# Patient Record
Sex: FEMALE | Race: WHITE | NOT HISPANIC OR LATINO | Employment: OTHER | URBAN - METROPOLITAN AREA
[De-identification: names, ages, dates, MRNs, and addresses within clinical notes are randomized per-mention and may not be internally consistent; named-entity substitution may affect disease eponyms.]

---

## 2022-09-20 ENCOUNTER — HOSPITAL ENCOUNTER (EMERGENCY)
Facility: HOSPITAL | Age: 62
Discharge: LEFT BEFORE DISCHARGE | End: 2022-09-20

## 2022-09-20 VITALS
OXYGEN SATURATION: 100 % | SYSTOLIC BLOOD PRESSURE: 114 MMHG | RESPIRATION RATE: 20 BRPM | HEIGHT: 67 IN | TEMPERATURE: 98.4 F | DIASTOLIC BLOOD PRESSURE: 66 MMHG | HEART RATE: 87 BPM | WEIGHT: 137.13 LBS | BODY MASS INDEX: 21.52 KG/M2

## 2022-09-20 PROCEDURE — 99211 OFF/OP EST MAY X REQ PHY/QHP: CPT

## 2022-09-21 ENCOUNTER — APPOINTMENT (OUTPATIENT)
Dept: GENERAL RADIOLOGY | Facility: HOSPITAL | Age: 62
End: 2022-09-21

## 2022-09-21 ENCOUNTER — HOSPITAL ENCOUNTER (EMERGENCY)
Facility: HOSPITAL | Age: 62
Discharge: HOME | End: 2022-09-21
Attending: EMERGENCY MEDICINE | Admitting: EMERGENCY MEDICINE

## 2022-09-21 VITALS
OXYGEN SATURATION: 100 % | BODY MASS INDEX: 20.75 KG/M2 | HEIGHT: 68 IN | SYSTOLIC BLOOD PRESSURE: 101 MMHG | TEMPERATURE: 98.1 F | RESPIRATION RATE: 18 BRPM | WEIGHT: 136.91 LBS | HEART RATE: 62 BPM | DIASTOLIC BLOOD PRESSURE: 61 MMHG

## 2022-09-21 DIAGNOSIS — M16.0 OSTEOARTHRITIS OF BOTH HIPS, UNSPECIFIED OSTEOARTHRITIS TYPE: Primary | ICD-10-CM

## 2022-09-21 PROCEDURE — 25010000002 KETOROLAC TROMETHAMINE PER 15 MG

## 2022-09-21 PROCEDURE — 99282 EMERGENCY DEPT VISIT SF MDM: CPT

## 2022-09-21 PROCEDURE — 96372 THER/PROPH/DIAG INJ SC/IM: CPT

## 2022-09-21 PROCEDURE — 73521 X-RAY EXAM HIPS BI 2 VIEWS: CPT

## 2022-09-21 RX ORDER — LISINOPRIL 20 MG/1
20 TABLET ORAL DAILY
COMMUNITY

## 2022-09-21 RX ORDER — DULOXETIN HYDROCHLORIDE 30 MG/1
30 CAPSULE, DELAYED RELEASE ORAL 2 TIMES DAILY
COMMUNITY
End: 2023-02-10

## 2022-09-21 RX ORDER — KETOROLAC TROMETHAMINE 30 MG/ML
30 INJECTION, SOLUTION INTRAMUSCULAR; INTRAVENOUS ONCE
Status: COMPLETED | OUTPATIENT
Start: 2022-09-21 | End: 2022-09-21

## 2022-09-21 RX ORDER — LIDOCAINE 50 MG/G
1 PATCH TOPICAL EVERY 24 HOURS
COMMUNITY

## 2022-09-21 RX ORDER — FLUTICASONE PROPIONATE 50 MCG
2 SPRAY, SUSPENSION (ML) NASAL DAILY
COMMUNITY

## 2022-09-21 RX ORDER — METOPROLOL SUCCINATE 25 MG/1
12.5 TABLET, EXTENDED RELEASE ORAL DAILY
COMMUNITY

## 2022-09-21 RX ADMIN — KETOROLAC TROMETHAMINE 30 MG: 30 INJECTION, SOLUTION INTRAMUSCULAR; INTRAVENOUS at 17:05

## 2022-09-21 NOTE — DISCHARGE INSTRUCTIONS
Take diclofenac as needed for hip pain.  Please follow-up with your primary care provider for monitoring of your arthritis of your hips.

## 2022-09-21 NOTE — ED PROVIDER NOTES
"Time: 5:12 PM EDT  Arrived by: private car  Chief Complaint: Hip pain  History provided by: Patient  History is limited by: N/A     History of Present Illness:  Patient is a 61 y.o. year old female who presents to the emergency department with bilateral hip pain.      Patient presents to the emergency department today complaining of bilateral hip pain.  Patient states she has chronic sciatica.  Patient states she has had bilateral hip pain for continuous months.  Patient states the pain waxes and wanes, but is achy in nature.  Patient currently rates the pain 8 out of 10.  Patient states she has been taking Tylenol and applying lidocaine patches to improve the pain.  Patient states she has previously had x-rays and CTs that did not show any pathology.  Patient states she also recently went to see her OB/GYN who cleared her for any pelvic abnormalities.  Patient denies any recent fall or injury.  No other complaints.      History provided by:  Patient   used: No    Hip Pain  Location:  Bilateral hip pain  Quality:  Aching  Severity:  Mild  Onset quality:  Gradual  Timing:  Intermittent  Progression:  Waxing and waning  Associated symptoms: no abdominal pain, no chest pain, no congestion, no cough, no diarrhea, no ear pain, no fatigue, no fever, no headaches, no loss of consciousness, no myalgias, no nausea, no rash, no rhinorrhea, no shortness of breath, no sore throat, no vomiting and no wheezing        Similar Symptoms Previously: Yes  Recently seen: No      Patient Care Team  Primary Care Provider: Aaliyah Black MD    Past Medical History:     Allergies   Allergen Reactions   • Gabapentin (Once-Daily) Other (See Comments)     Pt states that she went \"crazy\" when she was on that medication.    • Tramadol Hcl Dizziness     Past Medical History:   Diagnosis Date   • Chronic back pain    • Depression    • Enlarged heart    • Sciatica      Past Surgical History:   Procedure Laterality Date   • " " SECTION       History reviewed. No pertinent family history.    Home Medications:  Prior to Admission medications    Medication Sig Start Date End Date Taking? Authorizing Provider   DULoxetine (CYMBALTA) 30 MG capsule Take 30 mg by mouth 2 (Two) Times a Day.    Cole Erazo MD   empagliflozin (JARDIANCE) 25 MG tablet tablet Take  by mouth Daily.    Cole Erazo MD   fluticasone (FLONASE) 50 MCG/ACT nasal spray 2 sprays into the nostril(s) as directed by provider Daily.    Cole Erazo MD   lidocaine (LIDODERM) 5 % Place 1 patch on the skin as directed by provider Daily. Remove & Discard patch within 12 hours or as directed by MD    Cole Erazo MD   lisinopril (PRINIVIL,ZESTRIL) 20 MG tablet Take 10 mg by mouth Daily.    Cole Erazo MD   metoprolol succinate XL (TOPROL-XL) 25 MG 24 hr tablet Take 25 mg by mouth Daily.    Cole Erazo MD        Social History:   Social History     Tobacco Use   • Smoking status: Former Smoker   • Smokeless tobacco: Never Used   Vaping Use   • Vaping Use: Never used   Substance Use Topics   • Alcohol use: Never   • Drug use: Never     Recent travel: no     Review of Systems:  Review of Systems   Constitutional: Negative for chills, fatigue and fever.   HENT: Negative for congestion, ear pain, rhinorrhea and sore throat.    Eyes: Negative for pain.   Respiratory: Negative for cough, shortness of breath and wheezing.    Cardiovascular: Negative for chest pain.   Gastrointestinal: Negative for abdominal pain, diarrhea, nausea and vomiting.   Genitourinary: Negative for dysuria.   Musculoskeletal: Negative for arthralgias and myalgias.        Bilateral hip pain   Skin: Negative for rash.   Neurological: Negative for loss of consciousness and headaches.        Physical Exam:  /57 (BP Location: Left arm, Patient Position: Sitting)   Pulse 73   Temp 98.6 °F (37 °C) (Oral)   Resp 16   Ht 172.7 cm (68\")   Wt 62.1 kg " (136 lb 14.5 oz)   SpO2 100%   BMI 20.82 kg/m²     Physical Exam  Vitals and nursing note reviewed.   Constitutional:       General: She is not in acute distress.     Appearance: Normal appearance. She is normal weight. She is not ill-appearing, toxic-appearing or diaphoretic.   HENT:      Head: Normocephalic and atraumatic.      Nose: Nose normal.   Eyes:      Extraocular Movements: Extraocular movements intact.      Conjunctiva/sclera: Conjunctivae normal.      Pupils: Pupils are equal, round, and reactive to light.   Cardiovascular:      Rate and Rhythm: Normal rate and regular rhythm.      Heart sounds: Normal heart sounds.   Pulmonary:      Effort: Pulmonary effort is normal.      Breath sounds: Normal breath sounds.   Abdominal:      General: Abdomen is flat. Bowel sounds are normal. There is no distension.      Palpations: Abdomen is soft.      Tenderness: There is no abdominal tenderness. There is no guarding.   Musculoskeletal:         General: Normal range of motion.      Cervical back: Normal range of motion and neck supple.        Legs:       Comments: Bilateral tenderness to palpation along the lateral upper thigh as shown in the diagram above.  Normal range of motion.  Neurovascularly intact.  No bony tenderness noted.  Normal strength.  Normal gait.   Skin:     General: Skin is warm and dry.   Neurological:      General: No focal deficit present.      Mental Status: She is alert and oriented to person, place, and time.   Psychiatric:         Mood and Affect: Mood normal.         Behavior: Behavior normal.         Thought Content: Thought content normal.         Judgment: Judgment normal.                Medications in the Emergency Department:  Medications   ketorolac (TORADOL) injection 30 mg (30 mg Intramuscular Given 9/21/22 1705)        Labs  Lab Results (last 24 hours)     ** No results found for the last 24 hours. **           Imaging:  XR Hips Bilateral With or Without Pelvis 2 View    Result  Date: 9/21/2022  PROCEDURE: XR HIPS BILATERAL W OR WO PELVIS 2 VIEW  COMPARISON: None  INDICATIONS: bilateral hip pain, fall x 1 month ago  FINDINGS:  Degenerative changes of the pubic symphysis.  Osteopenia.  Moderate osteoarthritic changes of the right hip with joint space narrowing and osteophytes.  No definite fracture.  There is some nonspecific sclerosis in the left trochanter.  Degenerative changes seen of the left hip with joint space narrowing and osteophytosis.  Pelvic calcifications likely reflect phleboliths.        1. No definite fracture the left or right hip.  Moderate osteoarthritic changes.      PB DAVE MD       Electronically Signed and Approved By: PB DAVE MD on 9/21/2022 at 17:51               Procedures:  Procedures    Progress                            Medical Decision Making:  MDM  Number of Diagnoses or Management Options  Diagnosis management comments: I have spoken with patient. I have explained the patient´s condition, diagnoses and treatment plan based on the information available to me at this time. I have answered the patient's questions and addressed any concerns. The patient has a good  understanding of the patient´s diagnosis, condition, and treatment plan as can be expected at this point. The vital signs have been stable. The patient´s condition is stable and appropriate for discharge from the emergency department.      The patient will pursue further outpatient evaluation with the primary care physician or other designated or consulting physician as outlined in the discharge instructions. They are agreeable to this plan of care and follow-up instructions have been explained in detail. The patient has received these instructions in written format and have expressed an understanding of the discharge instructions. The patient is aware that any significant change in condition or worsening of symptoms should prompt an immediate return to this or the closest  emergency department or call to 911.       Amount and/or Complexity of Data Reviewed  Tests in the radiology section of CPT®: ordered and reviewed    Risk of Complications, Morbidity, and/or Mortality  Presenting problems: low  Diagnostic procedures: low  Management options: low    Patient Progress  Patient progress: stable       Final diagnoses:   Osteoarthritis of both hips, unspecified osteoarthritis type        Disposition:  ED Disposition     ED Disposition   Discharge    Condition   Stable    Comment   --             This medical record created using voice recognition software.           Jose Alejandro Gama PA-C  09/21/22 9112

## 2022-10-11 ENCOUNTER — OFFICE VISIT (OUTPATIENT)
Dept: GASTROENTEROLOGY | Facility: CLINIC | Age: 62
End: 2022-10-11

## 2022-10-11 VITALS
HEIGHT: 68 IN | BODY MASS INDEX: 20.61 KG/M2 | HEART RATE: 67 BPM | WEIGHT: 136 LBS | DIASTOLIC BLOOD PRESSURE: 53 MMHG | SYSTOLIC BLOOD PRESSURE: 109 MMHG

## 2022-10-11 DIAGNOSIS — R13.19 ESOPHAGEAL DYSPHAGIA: ICD-10-CM

## 2022-10-11 DIAGNOSIS — Z80.0 FAMILY HISTORY OF GASTRIC CANCER: ICD-10-CM

## 2022-10-11 DIAGNOSIS — Z86.010 HISTORY OF COLON POLYPS: Primary | ICD-10-CM

## 2022-10-11 PROBLEM — Z86.0100 HISTORY OF COLON POLYPS: Status: ACTIVE | Noted: 2022-10-11

## 2022-10-11 PROCEDURE — 99203 OFFICE O/P NEW LOW 30 MIN: CPT | Performed by: NURSE PRACTITIONER

## 2022-10-11 RX ORDER — POLYETHYLENE GLYCOL 3350, SODIUM SULFATE, SODIUM CHLORIDE, POTASSIUM CHLORIDE, ASCORBIC ACID, SODIUM ASCORBATE 140-9-5.2G
1 KIT ORAL TAKE AS DIRECTED
Qty: 1 EACH | Refills: 0 | Status: SHIPPED | OUTPATIENT
Start: 2022-10-11 | End: 2022-10-13 | Stop reason: CLARIF

## 2022-10-11 NOTE — PROGRESS NOTES
Chief Complaint     personal history of colon polyps    History of Present Illness     Risa Howard is a 61 y.o. female who presents to [facility] on referral from Aaliyah Black MD for a gastroenterology evaluation of history of colon polyps and dysphagia.      Reports previous colonoscopy in 2017 was her first colonoscopy.  She is having a daily bowel movement without hematochezia and melena.       Previous colonoscopy 2017 rectal polyp with chronic nonspecific inflammation and focal mucosal glandular hyperplasia.    Denies symptoms of reflux.  Reports noticing some dysphagia occasionally with foods.  Denies this being associated with any particular foods.  Notices this daily.   She thinks that her father had stomach cancer in the 70's and had to have most of his stomach removed.         History      Past Medical History:   Diagnosis Date   • Chronic back pain    • Depression    • Enlarged heart    • Sciatica        Past Surgical History:   Procedure Laterality Date   •  SECTION     • COLONOSCOPY         Family History   Problem Relation Age of Onset   • Stomach cancer Father         Current Medications        Current Outpatient Medications:   •  diclofenac (VOLTAREN) 50 MG EC tablet, Take 1 tablet by mouth 2 (Two) Times a Day As Needed (moderate pain)., Disp: 30 tablet, Rfl: 0  •  DULoxetine (CYMBALTA) 30 MG capsule, Take 30 mg by mouth 2 (Two) Times a Day., Disp: , Rfl:   •  empagliflozin (JARDIANCE) 25 MG tablet tablet, Take  by mouth Daily., Disp: , Rfl:   •  fluticasone (FLONASE) 50 MCG/ACT nasal spray, 2 sprays into the nostril(s) as directed by provider Daily., Disp: , Rfl:   •  lidocaine (LIDODERM) 5 %, Place 1 patch on the skin as directed by provider Daily. Remove & Discard patch within 12 hours or as directed by MD, Disp: , Rfl:   •  lisinopril (PRINIVIL,ZESTRIL) 20 MG tablet, Take 10 mg by mouth Daily., Disp: , Rfl:   •  metoprolol succinate XL  "(TOPROL-XL) 25 MG 24 hr tablet, Take 25 mg by mouth Daily., Disp: , Rfl:   •  PEG-KCl-NaCl-NaSulf-Na Asc-C (Plenvu) 140 g reconstituted solution solution, Take 140 g by mouth Take As Directed. Attn: Pharmacist: please see notes for coupon code., Disp: 1 each, Rfl: 0     Allergies     Allergies   Allergen Reactions   • Gabapentin (Once-Daily) Other (See Comments)     Pt states that she went \"crazy\" when she was on that medication.    • Tramadol Hcl Dizziness       Social History       Social History     Social History Narrative   • Not on file       Immunizations     Immunization:    There is no immunization history on file for this patient.       Objective     Objective     Vital Signs:   /53 (BP Location: Left arm)   Pulse 67   Ht 172.7 cm (68\")   Wt 61.7 kg (136 lb)   BMI 20.68 kg/m²       Physical Exam    Results      Result Review :                  Assessment and Plan        Assessment and Plan    Diagnoses and all orders for this visit:    1. History of colon polyps (Primary)  -     Case Request; Standing  -     Case Request    2. Esophageal dysphagia  -     Case Request; Standing  -     Case Request    3. Family history of gastric cancer  -     Case Request; Standing  -     Case Request    Other orders  -     Follow Anesthesia Guidelines / Protocol; Future  -     Verify NPO; Standing  -     Verify Bowel Prep Was Successful; Standing  -     Give Tap Water Enema If Bowel Prep Insufficient; Standing  -     PEG-KCl-NaCl-NaSulf-Na Asc-C (Plenvu) 140 g reconstituted solution solution; Take 140 g by mouth Take As Directed. Attn: Pharmacist: please see notes for coupon code.  Dispense: 1 each; Refill: 0        ESOPHAGOGASTRODUODENOSCOPY (N/A), COLONOSCOPY (N/A)  The risk of the endoscopy were discussed in detail. Possible risks/complications, benefits, and alternatives to surgical or invasive procedure have been explained to patient and/or legal guardian; risks include bleeding, infection, and perforation. " Patient has been evaluated and can tolerate anesthesia and/or sedation.       Follow Up        Follow Up   Return for F/U after procedure.  Patient was given instructions and counseling regarding her condition or for health maintenance advice. Please see specific information pulled into the AVS if appropriate.

## 2022-10-13 ENCOUNTER — TELEPHONE (OUTPATIENT)
Dept: GASTROENTEROLOGY | Facility: CLINIC | Age: 62
End: 2022-10-13

## 2022-10-13 RX ORDER — PEG-3350, SODIUM SULFATE, SODIUM CHLORIDE, POTASSIUM CHLORIDE, SODIUM ASCORBATE AND ASCORBIC ACID 7.5-2.691G
1000 KIT ORAL EVERY 12 HOURS
Qty: 1000 ML | Refills: 0 | Status: ON HOLD | OUTPATIENT
Start: 2022-10-13 | End: 2023-02-13

## 2022-10-13 NOTE — TELEPHONE ENCOUNTER
[facility].  Plenvu was sent in for the patient they do not care that formula.  They carry Nulytey and sometimes Movieprep.  Spoke with Homer BASHIR and she stated that is was okay to change to what the pharmacy had.  Verbal they are going to send Nulytley to the patient.

## 2023-01-30 ENCOUNTER — TELEPHONE (OUTPATIENT)
Dept: GASTROENTEROLOGY | Facility: CLINIC | Age: 63
End: 2023-01-30
Payer: OTHER GOVERNMENT

## 2023-02-03 ENCOUNTER — TELEPHONE (OUTPATIENT)
Dept: GASTROENTEROLOGY | Facility: CLINIC | Age: 63
End: 2023-02-03
Payer: OTHER GOVERNMENT

## 2023-02-03 NOTE — TELEPHONE ENCOUNTER
Patient called the office on 02/02/2023 at 2:50 pm and left a message that on 02/13/2023 she is scheduled for an EGD and colonoscopy. Patient stated that she does not want to complete the scope that goes down her throat just the colonoscopy.

## 2023-02-10 RX ORDER — FLUOXETINE HYDROCHLORIDE 20 MG/1
40 CAPSULE ORAL EVERY MORNING
COMMUNITY

## 2023-02-10 NOTE — PRE-PROCEDURE INSTRUCTIONS
Education provided on laxative administration, as well as diet restrictions prior to day of procedure.    Medications were assessed and reviewed.  Patient to stop taking Voltaren now.  The only medication to be taken by 0730 on the morning of the procedure is metoprolol, all other meds to resume post-procedure.    Advised Tylenol is okay, however do not take any NSAIDS.  All vitamins and supplements needs to be held until after the procedure.      Patient verbalized understanding.    Jagruti Johansen RN

## 2023-02-13 ENCOUNTER — ANESTHESIA (OUTPATIENT)
Dept: GASTROENTEROLOGY | Facility: HOSPITAL | Age: 63
End: 2023-02-13
Payer: OTHER GOVERNMENT

## 2023-02-13 ENCOUNTER — TELEPHONE (OUTPATIENT)
Dept: GASTROENTEROLOGY | Facility: CLINIC | Age: 63
End: 2023-02-13
Payer: OTHER GOVERNMENT

## 2023-02-13 ENCOUNTER — HOSPITAL ENCOUNTER (OUTPATIENT)
Facility: HOSPITAL | Age: 63
Setting detail: HOSPITAL OUTPATIENT SURGERY
Discharge: HOME | End: 2023-02-13
Attending: INTERNAL MEDICINE | Admitting: INTERNAL MEDICINE
Payer: OTHER GOVERNMENT

## 2023-02-13 ENCOUNTER — ANESTHESIA EVENT (OUTPATIENT)
Dept: GASTROENTEROLOGY | Facility: HOSPITAL | Age: 63
End: 2023-02-13
Payer: OTHER GOVERNMENT

## 2023-02-13 VITALS
RESPIRATION RATE: 16 BRPM | DIASTOLIC BLOOD PRESSURE: 67 MMHG | SYSTOLIC BLOOD PRESSURE: 102 MMHG | WEIGHT: 135 LBS | TEMPERATURE: 98.9 F | HEIGHT: 68 IN | HEART RATE: 81 BPM | BODY MASS INDEX: 20.46 KG/M2 | OXYGEN SATURATION: 97 %

## 2023-02-13 PROCEDURE — 45378 DIAGNOSTIC COLONOSCOPY: CPT | Performed by: INTERNAL MEDICINE

## 2023-02-13 PROCEDURE — 25010000002 PROPOFOL 10 MG/ML EMULSION: Performed by: NURSE ANESTHETIST, CERTIFIED REGISTERED

## 2023-02-13 RX ORDER — LIDOCAINE HYDROCHLORIDE 20 MG/ML
INJECTION, SOLUTION EPIDURAL; INFILTRATION; INTRACAUDAL; PERINEURAL AS NEEDED
Status: DISCONTINUED | OUTPATIENT
Start: 2023-02-13 | End: 2023-02-13 | Stop reason: SURG

## 2023-02-13 RX ORDER — SODIUM CHLORIDE, SODIUM LACTATE, POTASSIUM CHLORIDE, CALCIUM CHLORIDE 600; 310; 30; 20 MG/100ML; MG/100ML; MG/100ML; MG/100ML
30 INJECTION, SOLUTION INTRAVENOUS CONTINUOUS
Status: DISCONTINUED | OUTPATIENT
Start: 2023-02-13 | End: 2023-02-13 | Stop reason: HOSPADM

## 2023-02-13 RX ORDER — SODIUM CHLORIDE, SODIUM LACTATE, POTASSIUM CHLORIDE, CALCIUM CHLORIDE 600; 310; 30; 20 MG/100ML; MG/100ML; MG/100ML; MG/100ML
INJECTION, SOLUTION INTRAVENOUS CONTINUOUS PRN
Status: DISCONTINUED | OUTPATIENT
Start: 2023-02-13 | End: 2023-02-13 | Stop reason: SURG

## 2023-02-13 RX ORDER — PROPOFOL 10 MG/ML
VIAL (ML) INTRAVENOUS AS NEEDED
Status: DISCONTINUED | OUTPATIENT
Start: 2023-02-13 | End: 2023-02-13 | Stop reason: SURG

## 2023-02-13 RX ADMIN — SODIUM CHLORIDE, POTASSIUM CHLORIDE, SODIUM LACTATE AND CALCIUM CHLORIDE: 600; 310; 30; 20 INJECTION, SOLUTION INTRAVENOUS at 13:21

## 2023-02-13 RX ADMIN — PROPOFOL 50 MG: 10 INJECTION, EMULSION INTRAVENOUS at 13:38

## 2023-02-13 RX ADMIN — PROPOFOL 150 MCG/KG/MIN: 10 INJECTION, EMULSION INTRAVENOUS at 13:38

## 2023-02-13 RX ADMIN — SODIUM CHLORIDE, POTASSIUM CHLORIDE, SODIUM LACTATE AND CALCIUM CHLORIDE 30 ML/HR: 600; 310; 30; 20 INJECTION, SOLUTION INTRAVENOUS at 11:25

## 2023-02-13 RX ADMIN — LIDOCAINE HYDROCHLORIDE 50 MG: 20 INJECTION, SOLUTION EPIDURAL; INFILTRATION; INTRACAUDAL; PERINEURAL at 13:39

## 2023-02-13 NOTE — ANESTHESIA POSTPROCEDURE EVALUATION
Patient: Risa Howard    Procedure Summary     Date: 02/13/23 Room / Location: [facility] ENDOSCOPY 5 / [facility] ENDOSCOPY    Anesthesia Start: 1321 Anesthesia Stop: 1409    Procedure: COLONOSCOPY Diagnosis:       History of colon polyps      Esophageal dysphagia      Family history of gastric cancer      (History of colon polyps [Z86.010])      (Esophageal dysphagia [R13.19])      (Family history of gastric cancer [Z80.0])    Surgeons: Candy Ho MD Provider: Alyssa Ramirez MD    Anesthesia Type: general, MAC ASA Status: 2          Anesthesia Type: general, MAC    Vitals  Vitals Value Taken Time   BP 93/49 02/13/23 1409   Temp 36.7 °C (98 °F) 02/13/23 1408   Pulse 68 02/13/23 1411   Resp 14 02/13/23 1408   SpO2 100 % 02/13/23 1411   Vitals shown include unvalidated device data.        Post Anesthesia Care and Evaluation    Patient location during evaluation: bedside  Patient participation: complete - patient participated  Level of consciousness: awake  Pain management: adequate    Airway patency: patent  PONV Status: none  Cardiovascular status: acceptable  Respiratory status: acceptable  Hydration status: acceptable    Comments: An Anesthesiologist personally participated in the most demanding procedures (including induction and emergence if applicable) in the anesthesia plan, monitored the course of anesthesia administration at frequent intervals and remained physically present and available for immediate diagnosis and treatment of emergencies.

## 2023-02-13 NOTE — ANESTHESIA PREPROCEDURE EVALUATION
Anesthesia Evaluation     Patient summary reviewed and Nursing notes reviewed   no history of anesthetic complications:  NPO Solid Status: > 8 hours  NPO Liquid Status: > 2 hours           Airway   Mallampati: II  TM distance: >3 FB  Neck ROM: full  No difficulty expected  Dental    (+) poor dentition    Pulmonary - normal exam    breath sounds clear to auscultation  (+) a smoker Former,   Cardiovascular - negative cardio ROS and normal exam  Exercise tolerance: good (4-7 METS)    Patient on routine beta blocker and Beta blocker given within 24 hours of surgery  Rhythm: regular  Rate: normal        Neuro/Psych- negative ROS  GI/Hepatic/Renal/Endo - negative ROS     Musculoskeletal     Abdominal    Substance History - negative use     OB/GYN negative ob/gyn ROS         Other   arthritis,      ROS/Med Hx Other: PAT Nursing Notes unavailable.                   Anesthesia Plan    ASA 2     general and MAC     (Patient understands anesthesia not responsible for dental damage.)  intravenous induction     Anesthetic plan, risks, benefits, and alternatives have been provided, discussed and informed consent has been obtained with: patient.    Use of blood products discussed with patient .   Plan discussed with CRNA.        CODE STATUS:

## 2023-02-13 NOTE — H&P
"Pre Procedure History & Physical    Chief Complaint:   Surveillance colonoscopy    Subjective     HPI:   61 yo F here for eval of surveillance colonoscopy.    Past Medical History:   Past Medical History:   Diagnosis Date   • Arthritis    • Chronic back pain    • Depression    • Enlarged heart    • Sciatica        Past Surgical History:  Past Surgical History:   Procedure Laterality Date   • CARDIAC CATHETERIZATION      no stents placed   •  SECTION      x 2   • COLONOSCOPY         Family History:  Family History   Problem Relation Age of Onset   • Stomach cancer Father    • Malig Hyperthermia Neg Hx        Social History:   reports that she has quit smoking. She has never used smokeless tobacco. She reports that she does not drink alcohol and does not use drugs.    Medications:   Medications Prior to Admission   Medication Sig Dispense Refill Last Dose   • diclofenac (VOLTAREN) 50 MG EC tablet Take 1 tablet by mouth 2 (Two) Times a Day As Needed (moderate pain). 30 tablet 0 2023   • empagliflozin (JARDIANCE) 25 MG tablet tablet Take 25 mg by mouth Every Morning.   2023   • FLUoxetine (PROzac) 20 MG capsule Take 40 mg by mouth Every Morning.   2023   • fluticasone (FLONASE) 50 MCG/ACT nasal spray 2 sprays into the nostril(s) as directed by provider Daily.   2023   • lidocaine (LIDODERM) 5 % Place 1 patch on the skin as directed by provider Daily. Remove & Discard patch within 12 hours or as directed by MD   Past Week   • lisinopril (PRINIVIL,ZESTRIL) 20 MG tablet Take 20 mg by mouth Daily.   2023   • metoprolol succinate XL (TOPROL-XL) 25 MG 24 hr tablet Take 12.5 mg by mouth Daily.   2023       Allergies:  Gabapentin (once-daily) and Tramadol hcl    ROS:    Pertinent items are noted in HPI     Objective     Blood pressure 117/68, pulse 66, temperature 97.2 °F (36.2 °C), temperature source Temporal, resp. rate 20, height 172.7 cm (68\"), weight 61.2 kg (135 lb), SpO2 100 " %.    Physical Exam   Constitutional: Pt is oriented to person, place, and time and well-developed, well-nourished, and in no distress.   Mouth/Throat: Oropharynx is clear and moist.   Neck: Normal range of motion.   Cardiovascular: Normal rate, regular rhythm and normal heart sounds.    Pulmonary/Chest: Effort normal and breath sounds normal.   Abdominal: Soft. Nontender  Skin: Skin is warm and dry.   Psychiatric: Mood, memory, affect and judgment normal.     Assessment & Plan     Diagnosis:  Surveillance colonoscopy    Anticipated Surgical Procedure:  Colonoscopy    The risks, benefits, and alternatives of this procedure have been discussed with the patient or the responsible party- the patient understands and agrees to proceed.

## 2025-01-29 ENCOUNTER — OFFICE VISIT (OUTPATIENT)
Dept: FAMILY MEDICINE CLINIC | Facility: CLINIC | Age: 65
End: 2025-01-29
Payer: MEDICARE

## 2025-01-29 VITALS
SYSTOLIC BLOOD PRESSURE: 103 MMHG | HEIGHT: 68 IN | HEART RATE: 78 BPM | WEIGHT: 143 LBS | OXYGEN SATURATION: 99 % | DIASTOLIC BLOOD PRESSURE: 89 MMHG | BODY MASS INDEX: 21.67 KG/M2

## 2025-01-29 DIAGNOSIS — Z00.00 MEDICARE ANNUAL WELLNESS VISIT, INITIAL: Primary | ICD-10-CM

## 2025-01-29 DIAGNOSIS — R73.09 HIGH GLUCOSE: ICD-10-CM

## 2025-01-29 DIAGNOSIS — Z12.31 ENCOUNTER FOR SCREENING MAMMOGRAM FOR MALIGNANT NEOPLASM OF BREAST: ICD-10-CM

## 2025-01-29 DIAGNOSIS — Z78.0 POSTMENOPAUSAL: ICD-10-CM

## 2025-01-29 DIAGNOSIS — M54.9 BACK PAIN, UNSPECIFIED BACK LOCATION, UNSPECIFIED BACK PAIN LATERALITY, UNSPECIFIED CHRONICITY: ICD-10-CM

## 2025-01-29 DIAGNOSIS — I42.9 CARDIOMYOPATHY, UNSPECIFIED TYPE: ICD-10-CM

## 2025-01-29 DIAGNOSIS — B19.20 HEPATITIS C TEST POSITIVE: ICD-10-CM

## 2025-01-29 LAB
ALBUMIN UR-MCNC: <1.2 MG/DL
CHOLEST SERPL-MCNC: 229 MG/DL (ref 0–200)
HBA1C MFR BLD: 5.8 % (ref 4.8–5.6)
HCV AB SER QL: REACTIVE
HDLC SERPL-MCNC: 87 MG/DL (ref 40–60)
LDLC SERPL CALC-MCNC: 127 MG/DL (ref 0–100)
LDLC/HDLC SERPL: 1.43 {RATIO}
TRIGL SERPL-MCNC: 88 MG/DL (ref 0–150)
TSH SERPL DL<=0.05 MIU/L-ACNC: 3.66 UIU/ML (ref 0.27–4.2)
VLDLC SERPL-MCNC: 15 MG/DL (ref 5–40)

## 2025-01-29 PROCEDURE — 80061 LIPID PANEL: CPT | Performed by: STUDENT IN AN ORGANIZED HEALTH CARE EDUCATION/TRAINING PROGRAM

## 2025-01-29 PROCEDURE — 84443 ASSAY THYROID STIM HORMONE: CPT | Performed by: STUDENT IN AN ORGANIZED HEALTH CARE EDUCATION/TRAINING PROGRAM

## 2025-01-29 PROCEDURE — 82043 UR ALBUMIN QUANTITATIVE: CPT | Performed by: STUDENT IN AN ORGANIZED HEALTH CARE EDUCATION/TRAINING PROGRAM

## 2025-01-29 PROCEDURE — 83036 HEMOGLOBIN GLYCOSYLATED A1C: CPT | Performed by: STUDENT IN AN ORGANIZED HEALTH CARE EDUCATION/TRAINING PROGRAM

## 2025-01-29 PROCEDURE — 86803 HEPATITIS C AB TEST: CPT | Performed by: STUDENT IN AN ORGANIZED HEALTH CARE EDUCATION/TRAINING PROGRAM

## 2025-01-29 NOTE — PROGRESS NOTES
Subjective   The ABCs of the Annual Wellness Visit  Medicare Wellness Visit      Risa Howard is a 64 y.o. patient who presents for a Medicare Wellness Visit.    The following portions of the patient's history were reviewed and   updated as appropriate: allergies, current medications, past family history, past medical history, past social history, past surgical history, and problem list.    Compared to one year ago, the patient's physical   health is the same.  Compared to one year ago, the patient's mental   health is the same.    Recent Hospitalizations:  She was not admitted to the hospital during the last year.     Current Medical Providers:  Patient Care Team:  Felipe Sadler MD as PCP - General (Internal Medicine)    Outpatient Medications Prior to Visit   Medication Sig Dispense Refill    FLUoxetine (PROzac) 20 MG capsule Take 2 capsules by mouth Every Morning.      fluticasone (FLONASE) 50 MCG/ACT nasal spray Administer 2 sprays into the nostril(s) as directed by provider Daily.      lidocaine (LIDODERM) 5 % Place 1 patch on the skin as directed by provider Daily. Remove & Discard patch within 12 hours or as directed by MD      lisinopril (PRINIVIL,ZESTRIL) 20 MG tablet Take 1 tablet by mouth Daily.      metoprolol succinate XL (TOPROL-XL) 25 MG 24 hr tablet Take 0.5 tablets by mouth Daily.      diclofenac (VOLTAREN) 50 MG EC tablet Take 1 tablet by mouth 2 (Two) Times a Day As Needed (moderate pain). (Patient not taking: Reported on 1/29/2025) 30 tablet 0    empagliflozin (JARDIANCE) 25 MG tablet tablet Take 25 mg by mouth Every Morning. (Patient not taking: Reported on 1/29/2025)       No facility-administered medications prior to visit.     No opioid medication identified on active medication list. I have reviewed chart for other potential  high risk medication/s and harmful drug interactions in the elderly.      Aspirin is not on active medication list.  Aspirin use is not indicated based on  "review of current medical condition/s. Risk of harm outweighs potential benefits.  .    Patient Active Problem List   Diagnosis    History of colon polyps    Esophageal dysphagia    Family history of gastric cancer     Advance Care Planning Advance Directive is not on file.  ACP discussion was held with the patient during this visit. Patient does not have an advance directive, information provided.            Objective   Vitals:    01/29/25 1432   BP: 103/89   Pulse: 78   SpO2: 99%   Weight: 64.9 kg (143 lb)   Height: 172.7 cm (67.99\")     Vision Screening    Right eye Left eye Both eyes   Without correction      With correction   20:20      Estimated body mass index is 21.75 kg/m² as calculated from the following:    Height as of this encounter: 172.7 cm (67.99\").    Weight as of this encounter: 64.9 kg (143 lb).    BMI is within normal parameters. No other follow-up for BMI required.           Does the patient have evidence of cognitive impairment? No          Fall Risk Assessment was completed, and patient is at moderate risk for falls.                                                                                       Health  Risk Assessment    Smoking Status:  Social History     Tobacco Use   Smoking Status Former   Smokeless Tobacco Never     Alcohol Consumption:  Social History     Substance and Sexual Activity   Alcohol Use Never       Fall Risk Screen  STEADI Fall Risk Assessment was completed, and patient is at LOW risk for falls.Assessment completed on:1/29/2025    Depression Screening   Little interest or pleasure in doing things? Several days   Feeling down, depressed, or hopeless? Almost all   PHQ-2 Total Score 4   Trouble falling or staying asleep, or sleeping too much? Almost all   Feeling tired or having little energy? Almost all   Poor appetite or overeating? Not at all   Feeling bad about yourself - or that you are a failure or have let yourself or your family down? Almost all   Trouble " concentrating on things, such as reading the newspaper or watching television? Not at all   Moving or speaking so slowly that other people could have noticed? Or the opposite - being so fidgety or restless that you have been moving around a lot more than usual? Not at all   Thoughts that you would be better off dead, or of hurting yourself in some way? Not at all   PHQ-9 Total Score 13   If you checked off any problems, how difficult have these problems made it for you to do your work, take care of things at home, or get along with other people? Extremely difficult      Health Habits and Functional and Cognitive Screenin/29/2025     2:00 PM   Functional & Cognitive Status   Do you have difficulty preparing food and eating? No   Do you have difficulty bathing yourself, getting dressed or grooming yourself? No   Do you have difficulty using the toilet? No   Do you have difficulty moving around from place to place? No   Do you have trouble with steps or getting out of a bed or a chair? No   Current Diet Well Balanced Diet   Dental Exam Not up to date   Eye Exam Not up to date   Exercise (times per week) 0 times per week   Current Exercises Include No Regular Exercise   Do you need help using the phone?  No   Are you deaf or do you have serious difficulty hearing?  No   Do you need help to go to places out of walking distance? No   Do you need help shopping? No   Do you need help preparing meals?  No   Do you need help with housework?  No   Do you need help with laundry? No   Do you need help taking your medications? No   Do you need help managing money? No   Do you ever drive or ride in a car without wearing a seat belt? No   Have you felt unusual stress, anger or loneliness in the last month? No   Who do you live with? Alone   If you need help, do you have trouble finding someone available to you? No   Have you been bothered in the last four weeks by sexual problems? No   Do you have difficulty concentrating,  remembering or making decisions? No           Age-appropriate Screening Schedule:  Refer to the list below for future screening recommendations based on patient's age, sex and/or medical conditions. Orders for these recommended tests are listed in the plan section. The patient has been provided with a written plan.    Health Maintenance List  Health Maintenance   Topic Date Due    DXA SCAN  Never done    DIABETIC EYE EXAM  Never done    ZOSTER VACCINE (1 of 2) Never done    MAMMOGRAM  05/26/2014    HEPATITIS C SCREENING  Never done    ANNUAL WELLNESS VISIT  Never done    HEMOGLOBIN A1C  Never done    COVID-19 Vaccine (2 - 2024-25 season) 01/31/2025 (Originally 9/1/2024)    TDAP/TD VACCINES (2 - Td or Tdap) 01/29/2026 (Originally 9/28/2020)    DIABETIC FOOT EXAM  01/29/2026    COLORECTAL CANCER SCREENING  02/13/2028    INFLUENZA VACCINE  Completed    Pneumococcal Vaccine 65+  Completed                                                                                                                                                CMS Preventative Services Quick Reference  Risk Factors Identified During Encounter  None Identified    The above risks/problems have been discussed with the patient.  Pertinent information has been shared with the patient in the After Visit Summary.  An After Visit Summary and PPPS were made available to the patient.    Follow Up:   Next Medicare Wellness visit to be scheduled in 1 year.         Additional E&M Note during same encounter follows:  Patient has additional, significant, and separately identifiable condition(s)/problem(s) that require work above and beyond the Medicare Wellness Visit     Chief Complaint  Annual Exam and Establish Care (New pt needs pcp needs a referral for manas rob )    Subjective   HPI  Risa is also being seen today for an annual adult preventative physical exam.         Patient comes to establish care. She has a pmh of high glucose (not diabetes), HTN, HLD,  "depression, colon polyps. Patient comes for check in comorbidities. She requires labs today. Will follow up. Next visit in 6 months if labs okay.     Mammogram and DEXA ordered.  Pt needs to follow with her ophthalmologist.       Objective   Vital Signs:  /89   Pulse 78   Ht 172.7 cm (67.99\")   Wt 64.9 kg (143 lb)   SpO2 99%   BMI 21.75 kg/m²   Physical Exam  Vitals reviewed.   HENT:      Head: Normocephalic.      Mouth/Throat:      Mouth: Mucous membranes are moist.   Eyes:      Pupils: Pupils are equal, round, and reactive to light.   Cardiovascular:      Rate and Rhythm: Normal rate.   Abdominal:      General: Abdomen is flat.   Musculoskeletal:         General: Normal range of motion.      Cervical back: Normal range of motion.   Skin:     General: Skin is warm.      Capillary Refill: Capillary refill takes less than 2 seconds.   Neurological:      Mental Status: She is alert.     Foot/Ankle Musculoskeletal Exam    Palpation    Right      Right foot/ankle palpation is unremarkable.      Left       Left foot/ankle palpation is unremarkable.      Neurovascular    Right      Right foot/ankle neurovascular exam is normal.      Left      Left foot/ankle neurovascular exam is normal.      General    Neurological: alert       The following data was reviewed by: Felipe Prado MD on 01/29/2025:  Data reviewed : Radiologic studies                    Assessment and Plan Additional age appropriate preventative wellness advice topics were discussed during today's preventative wellness exam(some topics already addressed during AWV portion of the note above):    Physical Activity: Advised cardiovascular activity 150 minutes per week as tolerated. (example brisk walk for 30 minutes, 5 days a week).     Nutrition: Discussed nutrition plan with patient. Information shared in after visit summary. Goal is for a well balanced diet to enhance overall health.     Healthy Weight: Discussed current and goal BMI " with patient. Steps to attain this goal discussed. Information shared in after visit summary.     Gun Safety Awareness Discussion: Information Shared in after visit summary.     Tobacco Misuse Discussion: Information shared in after visit summary.     Motor Vehicle Safety Discussion:  Wearing Seatbelt While in Motor Vehicle recommendation. Adhering to posted speed limit recommendation.     Injury Prevention Discussion:  Information shared in after visit summary.                 Medicare annual wellness visit, initial         Postmenopausal    Orders:    DEXA Bone Density Axial; Future    Encounter for screening mammogram for malignant neoplasm of breast    Orders:    Mammo Screening Digital Tomosynthesis Bilateral With CAD; Future    Back pain, unspecified back location, unspecified back pain laterality, unspecified chronicity    Orders:    TSH    Lipid Panel    Hemoglobin A1c    MicroAlbumin, Urine, Random - Urine, Clean Catch    Hepatitis C antibody    DEXA Bone Density Axial; Future    Mammo Screening Digital Tomosynthesis Bilateral With CAD; Future    Ambulatory Referral to Pain Management    Ambulatory Referral to Physical Therapy for Evaluation & Treatment    XR Spine Lumbar 2 or 3 View; Future    Ambulatory Referral to Cardiology    Cardiomyopathy, unspecified type  Congestive heart failure due to hypertension.  Heart failure is stable.  NYHA Class II.  Continue current treatment regimen.  Heart failure will be reassessed in 6 months.        Orders:    Ambulatory Referral to Cardiology    High glucose    Orders:    TSH    Lipid Panel    Hemoglobin A1c    MicroAlbumin, Urine, Random - Urine, Clean Catch    Hepatitis C antibody    DEXA Bone Density Axial; Future    Mammo Screening Digital Tomosynthesis Bilateral With CAD; Future    Ambulatory Referral to Pain Management    Ambulatory Referral to Physical Therapy for Evaluation & Treatment    XR Spine Lumbar 2 or 3 View; Future    Ambulatory Referral to  Cardiology          I spent 25 minutes caring for Risa on this date of service. This time includes time spent by me in the following activities:preparing for the visit, reviewing tests, obtaining and/or reviewing a separately obtained history, performing a medically appropriate examination and/or evaluation , counseling and educating the patient/family/caregiver, ordering medications, tests, or procedures, referring and communicating with other health care professionals , documenting information in the medical record, independently interpreting results and communicating that information with the patient/family/caregiver, and care coordination  Follow Up   No follow-ups on file.  Patient was given instructions and counseling regarding her condition or for health maintenance advice. Please see specific information pulled into the AVS if appropriate.

## 2025-02-13 ENCOUNTER — TELEPHONE (OUTPATIENT)
Dept: FAMILY MEDICINE CLINIC | Facility: CLINIC | Age: 65
End: 2025-02-13
Payer: MEDICARE

## 2025-02-13 NOTE — TELEPHONE ENCOUNTER
----- Message from Felipe Sadler sent at 1/30/2025 10:21 AM EST -----  The 10-year ASCVD risk score (Braden ARAGON, et al., 2019) is: 3.3%    Values used to calculate the score:      Age: 64 years      Sex: Female      Is Non- : No      Diabetic: No      Tobacco smoker: No      Systolic Blood Pressure: 103 mmHg      Is BP treated: No      HDL Cholesterol: 87 mg/dL      Total Cholesterol: 229 mg/dL     Prediabetes.    Recommend healthy lifestyle.     Hep c Ab is positive. Let the patient know that she needs further labs to make sure she is not Hep C carrier. RNA hep c and HIV ordered. Will reassess with results.

## 2025-02-13 NOTE — TELEPHONE ENCOUNTER
"Relay     \"Recommend healthy lifestyle.     Hep c Ab is positive. Let the patient know that she needs further labs to make sure she is not Hep C carrier. RNA hep c and HIV ordered. Will reassess with results.\"                "

## 2025-02-14 ENCOUNTER — TELEPHONE (OUTPATIENT)
Dept: CARDIOLOGY | Facility: CLINIC | Age: 65
End: 2025-02-14
Payer: MEDICARE

## 2025-02-14 NOTE — TELEPHONE ENCOUNTER
[facility] received a fax that requires your attention. The document has been indexed to the patient’s chart for your review.      Reason for sending: EXTERNAL MEDICAL RECORD NOTIFICATION     Documents Description: [id number]    Name of Sender: FAVIO     Date Indexed: 2.14.25

## 2025-02-21 ENCOUNTER — TREATMENT (OUTPATIENT)
Dept: PHYSICAL THERAPY | Facility: CLINIC | Age: 65
End: 2025-02-21
Payer: MEDICARE

## 2025-02-21 DIAGNOSIS — R26.9 GAIT DISTURBANCE: ICD-10-CM

## 2025-02-21 DIAGNOSIS — M54.50 CHRONIC BILATERAL LOW BACK PAIN, UNSPECIFIED WHETHER SCIATICA PRESENT: Primary | ICD-10-CM

## 2025-02-21 DIAGNOSIS — R29.898 WEAKNESS OF RIGHT LOWER EXTREMITY: ICD-10-CM

## 2025-02-21 DIAGNOSIS — G89.29 CHRONIC BILATERAL LOW BACK PAIN, UNSPECIFIED WHETHER SCIATICA PRESENT: Primary | ICD-10-CM

## 2025-02-21 DIAGNOSIS — M25.551 RIGHT HIP PAIN: ICD-10-CM

## 2025-02-21 NOTE — PROGRESS NOTES
Physical Therapy Initial Evaluation and Plan of Care     [address] [place] [id number]    Patient: Risa Howard   : 1960  Diagnosis/ICD-10 Code:  Chronic bilateral low back pain, unspecified whether sciatica present [M54.50, G89.29]  Referring practitioner: Felipe Sadler, *  Date of Initial Visit: 2025  Today's Date: 2025  Patient seen for 1 sessions           Subjective Questionnaire: Oswestry: 35/45 = 77%        Subjective  : Pt presents to physical therapy with low back pain worsening over the last two years. Soreness on the outside of her back, she has fallen before and landed on her back. Pt has been told its the Right piriformis muscle, also uses a TENS unit and uses pain patches. [facility] along with [facility] pack. Admits to pain running down her leg, she has numbness in both feet, also in the right knee and shin. Takes extra strength tylenol.    Pt occupation/Living environment: lives in a two story home and has difficulty with stairs.    Patient Goals: Get the piriformis muscle under control.    Current Pain 4/10  Best Pain: 3/10  Worst Pain: 10/10 when she has to get up and move around  Quality of pain: aching, sharp        Past Medical Hx: heart problems as noted below     Past Medical History:   Diagnosis Date    Arthritis     Chronic back pain     Depression     Enlarged heart     Sciatica       Past Surgical History:   Procedure Laterality Date    CARDIAC CATHETERIZATION      no stents placed     SECTION      x 2    COLONOSCOPY      COLONOSCOPY N/A 2023    Procedure: COLONOSCOPY;  Surgeon: Candy oH MD;  Location: [facility] ENDOSCOPY;  Service: Gastroenterology;  Laterality: N/A;  DIVERTICULOSIS             Objective          Static Posture     Comments  Leans to the right, decreased right hip extension, pelvis higher on right    Palpation     Right   Muscle spasm in the gluteus kris, gluteus  medius, lumbar paraspinals, piriformis and quadratus lumborum. Tenderness of the gluteus kris, gluteus medius, lumbar paraspinals, piriformis, quadratus lumborum and TFL.     Neurological Testing     Sensation     Lumbar   Left   Diminished: light touch    Right   Diminished: light touch    Active Range of Motion     Lumbar   Flexion: Active lumbar flexion: deviates left. WFL  Extension: Active lumbar extension: limited 75% with pain    Additional Active Range of Motion Details  Limited 75% rotation and sidebending 75%    Strength/Myotome Testing     Left Hip   Planes of Motion   Flexion: 4-  Extension: 4-  Abduction: 4-  Adduction: 4-    Right Hip   Planes of Motion   Flexion: 3+  Extension: 3+  Abduction: 3+  Adduction: 3+    Tests       Thoracic   Positive slump.     Left Pelvic Girdle/Sacrum   Positive: sacral spring.     Right Pelvic Girdle/Sacrum   Positive: sacrum compression and sacral spring.     Ambulation     Comments   Uses cane in right hand, moderate to severe antalgia RLE      See Exercise, Manual, and Modality Logs for complete treatment.     Assessment & Plan       Assessment  Impairments: abnormal muscle firing, abnormal or restricted ROM, activity intolerance, impaired physical strength and pain with function   Functional limitations: carrying objects, lifting, walking, uncomfortable because of pain, sitting and standing   Assessment details: The patient presents to physical therapy with complaints of low back pain and right hip pain with s/s of referral into the right lower extremity, very tender in the right piriformis and gluteus medius. She has difficulty walking secondary to pain, very limited with movements. The patient presents with associated lower extremity weakness, lumbar stiffness, and functional deficits (OSWESTRY). The patient would benefit from skilled PT intervention to address the above mentioned functional limitations.     Prognosis: good    Goals  Plan Goals:     1. The  patient complains of low back pain.  LTG 1: 12 weeks:  The patient will report a pain rating of 3/10 or better at worst in order to improve  tolerance to activities of daily living and improve sleep quality.   STATUS:  New  STG 1a: 6 weeks:  The patient will report a pain rating of 5/10 or better at its worst.   STATUS:  New        2. The patient demonstrates weakness of the right hip.  LTG 2: 12 weeks:  The patient will demonstrate 4+ /5 strength for right hip flexion, abduction,  and extension in order to improve hip stability.   STATUS:  New  STG 2a: 6 weeks:  The patient will demonstrate 4 /5 strength for right hip flexion, abduction,  and extension.   STATUS:  New        3. The patient has gait dysfunction.  LTG 3: 12 weeks:  The patient will ambulate without assistive device, independently, for   community distances with minimal limp to the right lower extremity in order to improve mobility and allow   patient to perform activities such as grocery shopping with greater ease.   STATUS:  New  STG 3a: The patient will be independent with HEP.     STATUS:  New        4. Mobility: Walking/Moving Around Functional Limitation    LTG 4: 12 weeks:  The patient will demonstrate 1-19 % limitation by achieving a score of 1-9 on the RUTH.   STATUS:  New  STG 4 a: 6 weeks:  The patient will demonstrate 20-39 % limitation by achieving a score of 10-19 on the RUTH.     STATUS:  New        5. The patient reports radicular symptoms in the right lower extremity.  LTG 5: 12 weeks:  The patient will report a decrease in radicular symptoms in the right lower extremity by 75%.   STATUS:  New  STG 5a: 6 weeks:  The patient will report a decrease in radicular symptoms in the right lower extremity by 25%.   STATUS:  New             Plan  Therapy options: will be seen for skilled therapy services  Planned modality interventions: TENS, cryotherapy, thermotherapy (hydrocollator packs), traction and dry needling  Other planned modality  interventions: aquatic therapy  Planned therapy interventions: manual therapy, stretching, strengthening, therapeutic activities, neuromuscular re-education, home exercise program, joint mobilization, functional ROM exercises, soft tissue mobilization, spinal/joint mobilization, flexibility and gait training  Other planned therapy interventions: aquatic therapy  Frequency: 3x week  Duration in weeks: 12  Treatment plan discussed with: patient        Visit Diagnoses:    ICD-10-CM ICD-9-CM   1. Chronic bilateral low back pain, unspecified whether sciatica present  M54.50 724.2    G89.29 338.29   2. Gait disturbance  R26.9 781.2   3. Right hip pain  M25.551 719.45   4. Weakness of right lower extremity  R29.898 729.89       History # of Personal Factors and/or Comorbidities: MODERATE (1-2)  Examination of Body System(s): # of elements: MODERATE (3)  Clinical Presentation: EVOLVING  Clinical Decision Making: MODERATE      Timed:         Manual Therapy:    0     mins  22783;     Therapeutic Exercise:    14     mins  47852;     Neuromuscular Mina:    0    mins  72752;    Therapeutic Activity:     0     mins  51352;     Gait Trainin     mins  32561;     Ultrasound:     0     mins  98324;    Ionto                               0    mins   01063  Self Care                       10     mins   39920        Un-Timed:  Electrical Stimulation:    0     mins  12454 ( );  Dry Needling     0     mins self-pay  Canalith Repos    0     mins 94311  Traction     0     mins 49315      Timed Treatment:   24   mins   Total Treatment:     62   mins    PT SIGNATURE: Deuce Champagne PT     Electronically signed 2025    [place] License: PT - [id number]     Initial Certification  Certification Period: 2025 thru 2025  I certify that the therapy services are furnished while this patient is under my care.  The services outlined above are required by this patient, and will be reviewed every 90 days.     PHYSICIAN: Doroteo  Felipe Estrada MD   NPI: [id number]                                        DATE:     Please sign and return via fax to [phone]. Thank you, [facility].

## 2025-02-26 ENCOUNTER — TREATMENT (OUTPATIENT)
Dept: PHYSICAL THERAPY | Facility: CLINIC | Age: 65
End: 2025-02-26
Payer: MEDICARE

## 2025-02-26 DIAGNOSIS — R29.898 WEAKNESS OF RIGHT LOWER EXTREMITY: ICD-10-CM

## 2025-02-26 DIAGNOSIS — M25.551 RIGHT HIP PAIN: ICD-10-CM

## 2025-02-26 DIAGNOSIS — M54.50 CHRONIC BILATERAL LOW BACK PAIN, UNSPECIFIED WHETHER SCIATICA PRESENT: Primary | ICD-10-CM

## 2025-02-26 DIAGNOSIS — R26.9 GAIT DISTURBANCE: ICD-10-CM

## 2025-02-26 DIAGNOSIS — G89.29 CHRONIC BILATERAL LOW BACK PAIN, UNSPECIFIED WHETHER SCIATICA PRESENT: Primary | ICD-10-CM

## 2025-02-26 NOTE — PROGRESS NOTES
"Physical Therapy Daily Treatment Note  [place] [facility] [address] [place] [address]    Patient: Risa Howard   : 1960  Referring practitioner: Felipe Sadler, *  Date of Initial Visit: Type: THERAPY  Noted: 2025  Today's Date: 2025  Patient seen for 2 sessions           Subjective  Risa Howard reports:  she is hurting \"usual\" describing \"piriformis pain and tightness. I think it is RA due to my symptoms.\"     Objective   Risa arrived late for scheduled visit, treatment time shorted.  See Exercise, Manual, and Modality Logs for complete treatment.     Assessment/Plan  This is the first follow up after IE, with Risa requiring further intervention to address deficits.  Risa reported she felt the same after care, no change in her pain.    Visit Diagnoses:    ICD-10-CM ICD-9-CM   1. Chronic bilateral low back pain, unspecified whether sciatica present  M54.50 724.2    G89.29 338.29   2. Gait disturbance  R26.9 781.2   3. Right hip pain  M25.551 719.45   4. Weakness of right lower extremity  R29.898 729.89       Progress per Plan of Care and Progress strengthening /stabilization /functional activity         Timed:  Manual Therapy:    8     mins  71974;  Therapeutic Exercise:    15     mins  50822;     Neuromuscular Mina:        mins  53466;    Therapeutic Activity:          mins  20368;     Gait Training:           mins  85727;     Ultrasound:          mins  40156;    Electrical Stimulation:         mins  55244 ( );  Aquatics  __   mins   39943    Untimed:  Electrical Stimulation:         mins  17120 ( );  Mechanical Traction:         mins  59171;     Timed Treatment:   23   mins   Total Treatment:     23   mins    Electronically Signed:  Tita Kamara PTA  Physical Therapist Assistant    [place] PTA license [id number]            "

## 2025-02-28 ENCOUNTER — TREATMENT (OUTPATIENT)
Dept: PHYSICAL THERAPY | Facility: CLINIC | Age: 65
End: 2025-02-28
Payer: MEDICARE

## 2025-02-28 DIAGNOSIS — R26.9 GAIT DISTURBANCE: ICD-10-CM

## 2025-02-28 DIAGNOSIS — M54.50 CHRONIC BILATERAL LOW BACK PAIN, UNSPECIFIED WHETHER SCIATICA PRESENT: Primary | ICD-10-CM

## 2025-02-28 DIAGNOSIS — G89.29 CHRONIC BILATERAL LOW BACK PAIN, UNSPECIFIED WHETHER SCIATICA PRESENT: Primary | ICD-10-CM

## 2025-02-28 DIAGNOSIS — R29.898 WEAKNESS OF RIGHT LOWER EXTREMITY: ICD-10-CM

## 2025-02-28 DIAGNOSIS — M25.551 RIGHT HIP PAIN: ICD-10-CM

## 2025-02-28 NOTE — PROGRESS NOTES
Outpatient Physical Therapy                   Physical Therapy Daily Treatment Note    Patient: Risa Howard   : 1960  Diagnosis/ICD-10 Code:  Chronic bilateral low back pain, unspecified whether sciatica present [M54.50, G89.29]  Referring practitioner: Felipe Sadler, *  Date of Initial Visit: Type: THERAPY  Noted: 2025  Today's Date: 2025  Patient seen for 3 sessions             Subjective   Risa Howard reports: 7/10 pain, at time of arrival. Pt also reports that her left leg is completely numb and the right knee is hurting (quality of pain is burning) and is swollen.     Objective     See Exercise, Manual, and Modality Logs for complete treatment.     Assessment/Plan  Pt required cues throughout the session to slow down and to reduce ROM when increased pain occurs. Pt had decreased movement and stance time on the left LE with ambulation. Pt reports compliance with HEP but has she was having increased pain with LTR; exercise was reviewed and corrections were made. Plan to progress per tolerance.     Progress per Plan of Care      Timed:  Manual Therapy:    0     mins  65102;  Therapeutic Exercise:    17     mins  38172;     Neuromuscular Mina:    0    mins  35962;    Therapeutic Activity:     10     mins  77223;     Self care:                       0     mins  75120;  Gait Trainin     mins  52020;       Ultrasound:                    0     mins  99609;      Untimed:  Mechanical Traction:    0     mins  87592;   Electrical Stimulation:    0     mins  58137 ( );      Timed Treatment:   27   mins   Total Treatment:     27   mins      Electronically signed:   Trisha Cuellar PTA  Physical Therapist Assistant  [facility] License #: [id number]

## 2025-03-05 ENCOUNTER — TREATMENT (OUTPATIENT)
Dept: PHYSICAL THERAPY | Facility: CLINIC | Age: 65
End: 2025-03-05
Payer: MEDICARE

## 2025-03-05 DIAGNOSIS — R26.9 GAIT DISTURBANCE: ICD-10-CM

## 2025-03-05 DIAGNOSIS — R29.898 WEAKNESS OF RIGHT LOWER EXTREMITY: ICD-10-CM

## 2025-03-05 DIAGNOSIS — M54.50 CHRONIC BILATERAL LOW BACK PAIN, UNSPECIFIED WHETHER SCIATICA PRESENT: Primary | ICD-10-CM

## 2025-03-05 DIAGNOSIS — M25.551 RIGHT HIP PAIN: ICD-10-CM

## 2025-03-05 DIAGNOSIS — G89.29 CHRONIC BILATERAL LOW BACK PAIN, UNSPECIFIED WHETHER SCIATICA PRESENT: Primary | ICD-10-CM

## 2025-03-05 NOTE — PROGRESS NOTES
Physical Therapy Daily Treatment Note  [address] [place] [address]    Patient: Risa Howard   : 1960  Diagnosis/ICD-10 Code:  Chronic bilateral low back pain, unspecified whether sciatica present [M54.50, G89.29]  Referring practitioner: Felipe Sadler, *  Date of Initial Visit: Type: THERAPY  Noted: 2025  Today's Date: 3/5/2025  Patient seen for 4 sessions           Subjective : Patient reports she is still having a lot of pain, difficulty walking or getting in and out of bed and the car. She does relate that she is somewhat improved since starting her exercises and feels that some of the manual therapy and mobility activities have been helpful. She has stopped using her home traction unit because she doesn't feel it is doing much for her.    Objective   See Exercise, Manual, and Modality Logs for complete treatment.       Assessment/Plan : Pt tolerated today's session well. Reports decreased pain at the end of the session despite continued severe antalgia with walking and standing. Discussed dry needling and may perform a trial in the future. Pt would benefit from continued skilled therapy to address deficits. Progress per plan of care.             Timed:  Manual Therapy:    25     mins  88341;  Therapeutic Exercise:    5     mins  23541;     Neuromuscular Mina:    0    mins  77592;    Therapeutic Activity:     0     mins  99246;     Gait Trainin     mins  18733;     Ultrasound:     0     mins  45527;    Aquatic Therapy    0     mins  11125;    Untimed:  Electrical Stimulation:    0     mins  58690 ( );  Dry Needling     0     mins self-pay;  Mechanical Traction    0     mins  36716  Moist Heat     0     mins  No charge  Canalith Repos              0     mins 89657    Timed Treatment:   30   mins   Total Treatment:     30   mins    Deuce Champagne PT  Physical Therapist    Electronically signed 3/5/2025    [place] License: PT - [id number]

## 2025-03-07 ENCOUNTER — TREATMENT (OUTPATIENT)
Dept: PHYSICAL THERAPY | Facility: CLINIC | Age: 65
End: 2025-03-07
Payer: MEDICARE

## 2025-03-07 DIAGNOSIS — M25.551 RIGHT HIP PAIN: ICD-10-CM

## 2025-03-07 DIAGNOSIS — R26.9 GAIT DISTURBANCE: ICD-10-CM

## 2025-03-07 DIAGNOSIS — M54.50 CHRONIC BILATERAL LOW BACK PAIN, UNSPECIFIED WHETHER SCIATICA PRESENT: Primary | ICD-10-CM

## 2025-03-07 DIAGNOSIS — R29.898 WEAKNESS OF RIGHT LOWER EXTREMITY: ICD-10-CM

## 2025-03-07 DIAGNOSIS — G89.29 CHRONIC BILATERAL LOW BACK PAIN, UNSPECIFIED WHETHER SCIATICA PRESENT: Primary | ICD-10-CM

## 2025-03-07 NOTE — PROGRESS NOTES
Outpatient Physical Therapy                   Physical Therapy Daily Treatment Note    Patient: Risa Howard   : 1960  Diagnosis/ICD-10 Code:  Chronic bilateral low back pain, unspecified whether sciatica present [M54.50, G89.29]  Referring practitioner: Felipe Sadler, *  Date of Initial Visit: Type: THERAPY  Noted: 2025  Today's Date: 3/7/2025  Patient seen for 5 sessions             Subjective   Risa Howard reports: she has been resting a lot so her pain is low today.     Pain: 5/10 pain, at time of arrival.     Objective     See Exercise, Manual, and Modality Logs for complete treatment.     Assessment/Plan  Treatment session was limited by the patients late arrival. Pt was able to tolerated new exercises today better than previous exercises that were attempted. Pt would benefit from skilled PT to address Range of Motion  and Strength deficits, pain management and any concerns with ADLs.     Progress per Plan of Care      Timed:  Manual Therapy:    0     mins  59925;  Therapeutic Exercise:    12     mins  52907;     Neuromuscular Mina:    0    mins  00941;    Therapeutic Activity:     8     mins  92687;     Self care:                       0     mins  72140;  Gait Trainin     mins  25694;       Ultrasound:                    0     mins  95444;      Untimed:  Mechanical Traction:    0     mins  53210;   Electrical Stimulation:    0     mins  21698 ( );      Timed Treatment:   20   mins   Total Treatment:     20   mins      Electronically signed:   Trisha Cuellar PTA  Physical Therapist Assistant  [facility] License #: [id number]

## 2025-03-14 ENCOUNTER — TREATMENT (OUTPATIENT)
Dept: PHYSICAL THERAPY | Facility: CLINIC | Age: 65
End: 2025-03-14
Payer: MEDICARE

## 2025-03-14 DIAGNOSIS — M25.551 RIGHT HIP PAIN: ICD-10-CM

## 2025-03-14 DIAGNOSIS — R29.898 WEAKNESS OF RIGHT LOWER EXTREMITY: ICD-10-CM

## 2025-03-14 DIAGNOSIS — M54.50 CHRONIC BILATERAL LOW BACK PAIN, UNSPECIFIED WHETHER SCIATICA PRESENT: Primary | ICD-10-CM

## 2025-03-14 DIAGNOSIS — R26.9 GAIT DISTURBANCE: ICD-10-CM

## 2025-03-14 DIAGNOSIS — G89.29 CHRONIC BILATERAL LOW BACK PAIN, UNSPECIFIED WHETHER SCIATICA PRESENT: Primary | ICD-10-CM

## 2025-03-14 PROCEDURE — 97140 MANUAL THERAPY 1/> REGIONS: CPT

## 2025-03-14 PROCEDURE — 97110 THERAPEUTIC EXERCISES: CPT

## 2025-03-14 NOTE — PROGRESS NOTES
Outpatient Physical Therapy                   Physical Therapy Daily Treatment Note    Patient: Risa Howard   : 1960  Diagnosis/ICD-10 Code:  Chronic bilateral low back pain, unspecified whether sciatica present [M54.50, G89.29]  Referring practitioner: Felipe Sadler, *  Date of Initial Visit: Type: THERAPY  Noted: 2025  Today's Date: 3/14/2025  Patient seen for 6 sessions             Subjective   Risa Howard reports: her pain is exuberated today; likely from going up the stairs too quickly.     Pain: 9/10 pain, located on the R side of her back, from her low back to her neck.     Objective     See Exercise, Manual, and Modality Logs for complete treatment.     Assessment/Plan  Pt tolerated some exercises with some discomfort. Pt states that she got some relief with long axis distraction on the right. Pt would benefit from skilled PT to address Range of Motion  and Strength deficits, pain management and any concerns with ADLs.     Progress per Plan of Care      Timed:  Manual Therapy:    11     mins  74792;  Therapeutic Exercise:    10     mins  48288;     Neuromuscular Mina:    0    mins  22421;    Therapeutic Activity:     8     mins  26009;     Self care:                       0     mins  14939;  Gait Trainin     mins  33538;       Ultrasound:                    0     mins  45744;      Untimed:  Mechanical Traction:    0     mins  35708;   Electrical Stimulation:    0     mins  58486 ( );      Timed Treatment:   29   mins   Total Treatment:     29   mins      Electronically signed:   Trisha Cuellar PTA  Physical Therapist Assistant  [facility] License #: [id number]

## 2025-03-19 ENCOUNTER — TREATMENT (OUTPATIENT)
Dept: PHYSICAL THERAPY | Facility: CLINIC | Age: 65
End: 2025-03-19
Payer: MEDICARE

## 2025-03-19 DIAGNOSIS — M25.551 RIGHT HIP PAIN: ICD-10-CM

## 2025-03-19 DIAGNOSIS — R29.898 WEAKNESS OF RIGHT LOWER EXTREMITY: ICD-10-CM

## 2025-03-19 DIAGNOSIS — G89.29 CHRONIC BILATERAL LOW BACK PAIN, UNSPECIFIED WHETHER SCIATICA PRESENT: Primary | ICD-10-CM

## 2025-03-19 DIAGNOSIS — M54.50 CHRONIC BILATERAL LOW BACK PAIN, UNSPECIFIED WHETHER SCIATICA PRESENT: Primary | ICD-10-CM

## 2025-03-19 DIAGNOSIS — R26.9 GAIT DISTURBANCE: ICD-10-CM

## 2025-03-19 NOTE — PROGRESS NOTES
Progress Assessment  [address] [place] [id number]        Patient: Risa Howard   : 1960  Diagnosis/ICD-10 Code:  Chronic bilateral low back pain, unspecified whether sciatica present [M54.50, G89.29]  Referring practitioner: Felipe Sadler, *  Date of Initial Visit: Type: THERAPY  Noted: 2025  Today's Date: 3/19/2025  Patient seen for 7 sessions      Subjective:     Subjective Questionnaire: Oswestry: 77%  Clinical Progress: unchanged  Home Program Compliance: Yes  Treatment has included: therapeutic exercise, neuromuscular re-education, manual therapy, and therapeutic activity    Subjective : Risa Howard reports: she doesn't notice much change since starting in therapy. She is willing to try dry needling as previously discussed.     Current Pain 9/10      Objective          Palpation      Right   Muscle spasm in the gluteus kris, gluteus medius, lumbar paraspinals, piriformis and quadratus lumborum. Tenderness of the gluteus kris, gluteus medius, lumbar paraspinals, piriformis, quadratus lumborum and TFL.        Active Range of Motion      Lumbar   Flexion: Active lumbar flexion: deviates left. WFL  Extension: Active lumbar extension: limited 75% with pain     Additional Active Range of Motion Details  Limited 75% rotation and sidebending 75%     Strength/Myotome Testing      Left Hip   Planes of Motion   Flexion: 4-  Extension: 4-  Abduction: 4-  Adduction: 4-     Right Hip   Planes of Motion   Flexion: 3+  Extension: 3+  Abduction: 3+  Adduction: 3+        Ambulation      Comments   Uses cane in right hand, moderate to severe antalgia RLE        Assessment/Plan    Assessment details: The patient presents to physical therapy with complaints of low back pain and right hip pain with s/s of referral into the right lower extremity, very tender in the right piriformis and gluteus medius. She has difficulty walking secondary to pain, very limited with movements. She hasn't  made much progress subjectively with her pain most days. So, today, a trial of dry needling was completed to see if this would help with her symptoms. The patient presents with associated lower extremity weakness, lumbar stiffness, and functional deficits (OSWESTRY). The patient would benefit from skilled PT intervention to address the above mentioned functional limitations.     Pt gives written and verbal consent with description of procedure and review of risks, benefits, precautions, contraindications, and potential side effects. Education provided for post needling soreness and expectations. All dry needling performed to appropriate depth with bony backdrop, or threading to avoid any anatomic structures that are contraindicated. No adverse events noted.       Prognosis: good     Goals  Plan Goals:      1. The patient complains of low back pain.  LTG 1: 12 weeks:  The patient will report a pain rating of 3/10 or better at worst in order to improve  tolerance to activities of daily living and improve sleep quality.              STATUS:  Not met  STG 1a: 6 weeks:  The patient will report a pain rating of 5/10 or better at its worst.              STATUS:  Not met           2. The patient demonstrates weakness of the right hip.  LTG 2: 12 weeks:  The patient will demonstrate 4+ /5 strength for right hip flexion, abduction,  and extension in order to improve hip stability.              STATUS:  Not met  STG 2a: 6 weeks:  The patient will demonstrate 4 /5 strength for right hip flexion, abduction,  and extension.              STATUS:  Not met           3. The patient has gait dysfunction.  LTG 3: 12 weeks:  The patient will ambulate without assistive device, independently, for   community distances with minimal limp to the right lower extremity in order to improve mobility and allow   patient to perform activities such as grocery shopping with greater ease.              STATUS: Not met  STG 3a: The patient will be  independent with HEP.                STATUS:  Not met, ongoing           4. Mobility: Walking/Moving Around Functional Limitation                   LTG 4: 12 weeks:  The patient will demonstrate 1-19 % limitation by achieving a score of 1-9 on the RUTH.              STATUS:  Not met  STG 4 a: 6 weeks:  The patient will demonstrate 20-39 % limitation by achieving a score of 10-19 on the RUTH.                STATUS:  Not met           5. The patient reports radicular symptoms in the right lower extremity.  LTG 5: 12 weeks:  The patient will report a decrease in radicular symptoms in the right lower extremity by 75%.              STATUS:  Not met  STG 5a: 6 weeks:  The patient will report a decrease in radicular symptoms in the right lower extremity by 25%.              STATUS:  Not met            Progress toward previous goals: Not Met    See Exercise, Manual, and Modality Logs for complete treatment.         Recommendations: Continue with recommendations trial of dry needling  Timeframe: 2 months  Prognosis to achieve goals: fair    PT Signature: Deuce Champagne PT    Electronically signed 3/19/2025    [place] License: PT - [id number]     Based upon review of the patient's progress and continued therapy plan, it is my medical opinion that Risa Howard should continue physical therapy treatment at [facility] PHYSICAL THERAPY  [address]   [place] [address] [phone].      Timed:         Manual Therapy:    8     mins  27696;     Therapeutic Exercise:    15     mins  71750;     Neuromuscular Mina:    0    mins  06671;    Therapeutic Activity:     0     mins  67038;     Gait Trainin     mins  17252;     Ultrasound:     0     mins  83993;    Self Care                       0     mins   17486  Aquatic                          0     mins 25279      Un-Timed:  Electrical Stimulation:    0     mins  68119 ( );  Dry Needling     10     mins self-pay  Canalith Repos    0     mins  58462  Traction     0     mins 90330        Timed Treatment:   23   mins   Total Treatment:     33   mins      I certify that the therapy services are furnished while this patient is under my care.  The services outlined above are required by this patient, and will be reviewed every 90 days.

## 2025-03-21 ENCOUNTER — TREATMENT (OUTPATIENT)
Dept: PHYSICAL THERAPY | Facility: CLINIC | Age: 65
End: 2025-03-21
Payer: MEDICARE

## 2025-03-21 DIAGNOSIS — M54.50 CHRONIC BILATERAL LOW BACK PAIN, UNSPECIFIED WHETHER SCIATICA PRESENT: Primary | ICD-10-CM

## 2025-03-21 DIAGNOSIS — G89.29 CHRONIC BILATERAL LOW BACK PAIN, UNSPECIFIED WHETHER SCIATICA PRESENT: Primary | ICD-10-CM

## 2025-03-21 DIAGNOSIS — M25.551 RIGHT HIP PAIN: ICD-10-CM

## 2025-03-21 DIAGNOSIS — R29.898 WEAKNESS OF RIGHT LOWER EXTREMITY: ICD-10-CM

## 2025-03-21 DIAGNOSIS — R26.9 GAIT DISTURBANCE: ICD-10-CM

## 2025-03-21 NOTE — PROGRESS NOTES
Outpatient Physical Therapy                   Physical Therapy Daily Treatment Note    Patient: Risa Howard   : 1960  Diagnosis/ICD-10 Code:  Chronic bilateral low back pain, unspecified whether sciatica present [M54.50, G89.29]  Referring practitioner: Felipe Sadler, *  Date of Initial Visit: Type: THERAPY  Noted: 2025  Today's Date: 3/21/2025  Patient seen for 8 sessions             Subjective   Risa Howard reports: the pain has traveled from the right side to the left side; pt states it is on both sides depending on the movements. Pt states it feels like nerve pain. Pt states she feels like she may be getting stronger.   Pt states she hurt after last session she hurt but still feels that needling will be helpful going forward. Pt states the pain on her left side started after last session. Pt states she feels like the traction was helpful too.     Pain: 5/10 pain, but it has ranged from 5-10/10 pain today.     Objective     See Exercise, Manual, and Modality Logs for complete treatment.     Assessment/Plan  Risa still experiencing increased  leg and back  pain. Pt tolerated exercises well, with minimal complaints compared to previous sessions. Pt would benefit from skilled PT to address Range of Motion and Strength deficits, pain management and any concerns with ADLs. Pt states her pain reduced to 3-4/10 pain when in supine after exercises. Pt states when she got back up after manual her pain was still good; 7/10 pain.     Progress per Plan of Care      Timed:  Manual Therapy:    8     mins  63975;  Therapeutic Exercise:    17     mins  77643;     Neuromuscular Mina:    0    mins  10985;    Therapeutic Activity:     0     mins  56291;     Self care:                       0     mins  50251;  Gait Trainin     mins  53431;       Ultrasound:                    0     mins  93418;      Untimed:  Mechanical Traction:    0     mins  14813;   Electrical Stimulation:    0     mins   92065 ( );      Timed Treatment:   25   mins   Total Treatment:     25   mins      Electronically signed:   Trisha Cuellar PTA  Physical Therapist Assistant  [facility] License #: [id number]   18

## 2025-03-25 ENCOUNTER — OFFICE VISIT (OUTPATIENT)
Dept: FAMILY MEDICINE CLINIC | Facility: CLINIC | Age: 65
End: 2025-03-25
Payer: MEDICARE

## 2025-03-25 VITALS
SYSTOLIC BLOOD PRESSURE: 113 MMHG | OXYGEN SATURATION: 98 % | BODY MASS INDEX: 21.46 KG/M2 | HEART RATE: 92 BPM | DIASTOLIC BLOOD PRESSURE: 68 MMHG | HEIGHT: 68 IN | TEMPERATURE: 98 F | WEIGHT: 141.6 LBS

## 2025-03-25 DIAGNOSIS — F32.1 CURRENT MODERATE EPISODE OF MAJOR DEPRESSIVE DISORDER, UNSPECIFIED WHETHER RECURRENT: ICD-10-CM

## 2025-03-25 DIAGNOSIS — E83.42 HYPOMAGNESEMIA: Primary | ICD-10-CM

## 2025-03-25 RX ORDER — MAGNESIUM 30 MG
30 TABLET ORAL DAILY
Qty: 90 TABLET | Refills: 2 | Status: SHIPPED | OUTPATIENT
Start: 2025-03-25 | End: 2026-03-25

## 2025-03-25 RX ORDER — FLUOXETINE HYDROCHLORIDE 40 MG/1
40 CAPSULE ORAL DAILY
Qty: 90 CAPSULE | Refills: 2 | Status: SHIPPED | OUTPATIENT
Start: 2025-03-25

## 2025-03-25 NOTE — PROGRESS NOTES
"Chief Complaint  Med Management    Subjective      Risa Howard is a 64 y.o. female who presents to [facility]     History of Present Illness  The patient is a 64-year-old female who presents for evaluation of depression and back pain.     She is currently on a regimen of Prozac 40 mg daily, [facility] Due to frequent changes in her healthcare providers, she has been receiving her medication from various sources. Recently, she was advised to increase her Prozac dosage to two tablets daily, which she has been adhering to. She is seeking a prescription for a single 40 mg tablet of Prozac to simplify her medication management. Additionally, she is interested in starting magnesium supplementation    She is scheduled for an appointment at [facility] for evaluation of her back pain, with the hope that surgical intervention will not be necessary. She has been attending physical therapy sessions twice a week, where she has undergone dry needling procedures.        Patient Care Team:  Felipe Sadler MD as PCP - General (Internal Medicine)        Review of Systems  Per HPI    Objective   Vital Signs:   Vitals:    03/25/25 1533   BP: 113/68   BP Location: Left arm   Patient Position: Sitting   Cuff Size: Adult   Pulse: 92   Temp: 98 °F (36.7 °C)   TempSrc: Temporal   SpO2: 98%   Weight: 64.2 kg (141 lb 9.6 oz)   Height: 172.7 cm (67.99\")     Body mass index is 21.54 kg/m².    Wt Readings from Last 3 Encounters:   03/25/25 64.2 kg (141 lb 9.6 oz)   01/29/25 64.9 kg (143 lb)   02/10/23 61.2 kg (135 lb)     BP Readings from Last 3 Encounters:   03/25/25 113/68   01/29/25 103/89   02/13/23 102/67       Health Maintenance   Topic Date Due    DXA SCAN  Never done    ZOSTER VACCINE (1 of 2) Never done    MAMMOGRAM  05/26/2014    Hepatitis B (1 of 3 - Risk 3-dose series) Never done    COVID-19 Vaccine (2 - 2024-25 season) 09/01/2024    TDAP/TD " VACCINES (3 - Td or Tdap) 01/29/2026 (Originally 11/12/2025)    ANNUAL WELLNESS VISIT  01/29/2026    COLORECTAL CANCER SCREENING  02/13/2028    HEPATITIS C SCREENING  Completed    INFLUENZA VACCINE  Completed    Pneumococcal Vaccine 50+  Completed    HEMOGLOBIN A1C  Discontinued       Physical Exam  Constitutional:       Appearance: Normal appearance.   HENT:      Head: Normocephalic and atraumatic.      Mouth/Throat:      Mouth: Mucous membranes are moist.   Eyes:      Pupils: Pupils are equal, round, and reactive to light.   Cardiovascular:      Rate and Rhythm: Normal rate and regular rhythm.      Heart sounds: Normal heart sounds.   Abdominal:      Palpations: Abdomen is soft.   Neurological:      General: No focal deficit present.      Mental Status: She is alert and oriented to person, place, and time.   Psychiatric:         Mood and Affect: Mood normal.         Behavior: Behavior normal.         Physical Exam  Lungs were auscultated.       Result Review   The following data was reviewed by: Marcia Thao MD on 03/25/2025:  [x]  Tests & Results  []  Hospitalization/Emergency Department/Urgent Care  []  Internal/External Consultant Notes      Results         ASSESSMENT/PLAN  Diagnoses and all orders for this visit:    1. Hypomagnesemia (Primary)  -     magnesium 30 MG tablet; Take 1 tablet by mouth Daily.  Dispense: 90 tablet; Refill: 2    2. Current moderate episode of major depressive disorder, unspecified whether recurrent  -     FLUoxetine (PROzac) 40 MG capsule; Take 1 capsule by mouth Daily.  Dispense: 90 capsule; Refill: 2        Assessment & Plan  1. Depression.  She is currently taking Prozac 40 mg daily. A [facility] 40 mg as a [facility] She is advised to continue taking two 20 mg tablets until the new prescription is available, after which she should switch to one 40 mg tablet daily.    2. Back pain.  She is undergoing physical therapy  twice a week and has received dry needling treatment. The potential benefits of dry needling, including the release of muscle knots and trigger points, were discussed. She reports some soreness after the procedure but acknowledges improvement in her condition.    3. Magnesium supplementation.  She expressed interest in taking magnesium for muscle health. It was explained that magnesium supplementation is beneficial only if there is a deficiency. A prescription for plain magnesium 30 mg daily has been ordered.       BMI is within normal parameters. No other follow-up for BMI required.       Risa KIM Howard  reports that she has quit smoking. She has never used smokeless tobacco.             FOLLOW UP  Return if symptoms worsen or fail to improve.  Patient was given instructions and counseling regarding her condition or for health maintenance advice. Please see specific information pulled into the AVS if appropriate.       Marcia Thao MD  03/25/25  16:31 EDT

## 2025-03-26 ENCOUNTER — TREATMENT (OUTPATIENT)
Dept: PHYSICAL THERAPY | Facility: CLINIC | Age: 65
End: 2025-03-26
Payer: MEDICARE

## 2025-03-26 DIAGNOSIS — M25.551 RIGHT HIP PAIN: ICD-10-CM

## 2025-03-26 DIAGNOSIS — G89.29 CHRONIC BILATERAL LOW BACK PAIN, UNSPECIFIED WHETHER SCIATICA PRESENT: Primary | ICD-10-CM

## 2025-03-26 DIAGNOSIS — R29.898 WEAKNESS OF RIGHT LOWER EXTREMITY: ICD-10-CM

## 2025-03-26 DIAGNOSIS — M54.50 CHRONIC BILATERAL LOW BACK PAIN, UNSPECIFIED WHETHER SCIATICA PRESENT: Primary | ICD-10-CM

## 2025-03-26 DIAGNOSIS — R26.9 GAIT DISTURBANCE: ICD-10-CM

## 2025-03-26 NOTE — PROGRESS NOTES
Physical Therapy Daily Treatment Note  [address] [place] [address]    Patient: Risa Howard   : 1960  Diagnosis/ICD-10 Code:  Chronic bilateral low back pain, unspecified whether sciatica present [M54.50, G89.29]  Referring practitioner: Felipe Sadler, *  Date of Initial Visit: Type: THERAPY  Noted: 2025  Today's Date: 3/26/2025  Patient seen for 9 sessions           Subjective : Patient reports she is still having back and leg pain, though she was able to lift and do more today than she feels like she has been able to do because of her back.    Objective   See Exercise, Manual, and Modality Logs for complete treatment.       Assessment/Plan : Pt tolerated today's session well. Pt gives written and verbal consent with description of procedure and review of risks, benefits, precautions, contraindications, and potential side effects. Education provided for post needling soreness and expectations. All dry needling performed to appropriate depth with bony backdrop, or threading to avoid any anatomic structures that are contraindicated. No adverse events noted. Pt would benefit from continued skilled therapy to address deficits. Progress per plan of care.             Timed:  Manual Therapy:   12     mins  29898;  Therapeutic Exercise:    0     mins  85774;     Neuromuscular Mina:    0    mins  31730;    Therapeutic Activity:     12     mins  03367;     Gait Trainin     mins  51090;     Ultrasound:     0     mins  58893;    Aquatic Therapy    0     mins  10171;    Untimed:  Electrical Stimulation:    0     mins  72229 ( );  Dry Needling     10     mins self-pay;  Mechanical Traction    0     mins  08301  Moist Heat     0     mins  No charge  Canalith Repos              0     mins 63600    Timed Treatment:   24  mins   Total Treatment:     34   mins    Deuce Champagne PT  Physical Therapist    Electronically signed 3/26/2025    [place] License: PT - [id number]

## 2025-03-28 ENCOUNTER — TELEPHONE (OUTPATIENT)
Dept: PHYSICAL THERAPY | Facility: CLINIC | Age: 65
End: 2025-03-28

## 2025-04-02 ENCOUNTER — TELEPHONE (OUTPATIENT)
Dept: PHYSICAL THERAPY | Facility: CLINIC | Age: 65
End: 2025-04-02
Payer: MEDICARE

## 2025-04-02 NOTE — TELEPHONE ENCOUNTER
Caller: Risa Howard    Relationship:  Self    PATIENT CALLED REQUESTING TO CANCEL SAME DAY APPT.    Did the patient call AFTER the start time of their scheduled appointment?  []YES  [x]NO    Was the patient's appointment rescheduled? []YES  [x]NO    Any additional information: FAMILY EM[facility]Y

## 2025-04-22 ENCOUNTER — TRANSCRIBE ORDERS (OUTPATIENT)
Dept: GENERAL RADIOLOGY | Facility: HOSPITAL | Age: 65
End: 2025-04-22
Payer: MEDICARE

## 2025-04-22 ENCOUNTER — HOSPITAL ENCOUNTER (OUTPATIENT)
Dept: GENERAL RADIOLOGY | Facility: HOSPITAL | Age: 65
Discharge: HOME | End: 2025-04-22
Admitting: ANESTHESIOLOGY
Payer: MEDICARE

## 2025-04-22 ENCOUNTER — TELEPHONE (OUTPATIENT)
Dept: ORTHOPEDICS | Facility: OTHER | Age: 65
End: 2025-04-22
Payer: MEDICARE

## 2025-04-22 DIAGNOSIS — M54.17 RADICULOPATHY, LUMBOSACRAL REGION: Primary | ICD-10-CM

## 2025-04-22 DIAGNOSIS — M54.17 RADICULOPATHY, LUMBOSACRAL REGION: ICD-10-CM

## 2025-04-22 PROCEDURE — 72100 X-RAY EXAM L-S SPINE 2/3 VWS: CPT

## 2025-05-14 ENCOUNTER — TREATMENT (OUTPATIENT)
Dept: PHYSICAL THERAPY | Facility: CLINIC | Age: 65
End: 2025-05-14
Payer: MEDICARE

## 2025-05-14 DIAGNOSIS — M25.551 RIGHT HIP PAIN: ICD-10-CM

## 2025-05-14 DIAGNOSIS — G89.29 CHRONIC BILATERAL LOW BACK PAIN, UNSPECIFIED WHETHER SCIATICA PRESENT: Primary | ICD-10-CM

## 2025-05-14 DIAGNOSIS — R29.898 WEAKNESS OF RIGHT LOWER EXTREMITY: ICD-10-CM

## 2025-05-14 DIAGNOSIS — R26.9 GAIT DISTURBANCE: ICD-10-CM

## 2025-05-14 DIAGNOSIS — M54.50 CHRONIC BILATERAL LOW BACK PAIN, UNSPECIFIED WHETHER SCIATICA PRESENT: Primary | ICD-10-CM

## 2025-05-14 NOTE — PROGRESS NOTES
Progress Note / Discharge Summary  [facility]: [address] [place] [address]      Patient: Risa Howard   : 1960  Diagnosis/ICD-10 Code:  Chronic bilateral low back pain, unspecified whether sciatica present [M54.50, G89.29]  Referring practitioner: Felipe Sadler, *  Date of Initial Visit: Type: THERAPY  Noted: 2025  Encounter Date:  2025  Patient seen for 10 sessions      Subjective:   Subjective Questionnaire: Oswestry: 23/45 (51%)  Clinical Progress: improved  Home Program Compliance: No  Treatment has included: balance/weight-bearing training, ADL retraining, soft tissue mobilization, strengthening, stretching, therapeutic activities, manual therapy, joint mobilization, home exercise program/patient education, gait training, functional ROM exercises, flexibility, body mechanics training, postural training, and neuromuscular re-education    Subjective   Risa Howard reports therapy has not seemed to help. Pt reports the went to pain management for their spine, though they are unsure about receiving injections or a surgical intervention. Pt reports a machine at home to stretch and would like to utilize this at this time. Pt rates their pain is currently at a 6-7/10. Pt reports their pain reaches a 10/10 if they are walking longer than 10 minutes.       Objective   Palpation      Right   Muscle spasm in the gluteus kris, gluteus medius, lumbar paraspinals, piriformis and quadratus lumborum. Tenderness of the gluteus kris, gluteus medius, lumbar paraspinals, piriformis, quadratus lumborum and TFL.         Active Range of Motion      Lumbar   Flexion: Active lumbar flexion: deviates left. WFL  Extension: Active lumbar extension: limited 75% with pain     Additional Active Range of Motion Details  Limited WFL rotation and sidebending WFL, painful to the L     Strength/Myotome Testing      Left Hip   Planes of Motion   Flexion: 4  Extension: 4-  Abduction:  4-  Adduction: 4-     Right Hip   Planes of Motion   Flexion: 4-  Extension: 3+  Abduction: 4-  Adduction: 4-       See Exercise, Manual, and Modality Logs for complete treatment.     Assessment/Plan  Pt reports to therapy w/ complaints of chronic B Low back pain. Pt has minimal changes to their back pain at this time. Pt elects to continue pursuing a pain management route and to utilize what appears to be a traction machine at home. Pt was encouraged to keep up w/ exs to their tolerance to further improve upon their strength for improvement in ADLs. Pt's goals have been addressed. Pt is discharged from therapy.     Goals  Plan Goals:      1. The patient complains of low back pain.  LTG 1: 12 weeks:  The patient will report a pain rating of 3/10 or better at worst in order to improve  tolerance to activities of daily living and improve sleep quality.              STATUS:  Not met  STG 1a: 6 weeks:  The patient will report a pain rating of 5/10 or better at its worst.              STATUS:  Not met           2. The patient demonstrates weakness of the right hip.  LTG 2: 12 weeks:  The patient will demonstrate 4+ /5 strength for right hip flexion, abduction,  and extension in order to improve hip stability.              STATUS:  Not met  STG 2a: 6 weeks:  The patient will demonstrate 4 /5 strength for right hip flexion, abduction,  and extension.              STATUS:  Not met           3. The patient has gait dysfunction.  LTG 3: 12 weeks:  The patient will ambulate without assistive device, independently, for   community distances with minimal limp to the right lower extremity in order to improve mobility and allow   patient to perform activities such as grocery shopping with greater ease.              STATUS: Not met  STG 3a: The patient will be independent with HEP.                STATUS:  Not met, ongoing           4. Mobility: Walking/Moving Around Functional Limitation                   LTG 4: 12 weeks:  The  patient will demonstrate 1-19 % limitation by achieving a score of 1-9 on the RUTH.              STATUS:  Not met  STG 4 a: 6 weeks:  The patient will demonstrate 20-39 % limitation by achieving a score of 10-19 on the RUTH.                STATUS:  Not met           5. The patient reports radicular symptoms in the right lower extremity.  LTG 5: 12 weeks:  The patient will report a decrease in radicular symptoms in the right lower extremity by 75%.              STATUS:  Not met  STG 5a: 6 weeks:  The patient will report a decrease in radicular symptoms in the right lower extremity by 25%.              STATUS:  Not met  Progress toward previous goals: Not Met      Recommendations: Discharge      PT Signature: Jt Goode PT, DPT    Electronically signed 2025    [place] License: PT - [id number]     Based upon review of the patient's progress and continued therapy plan, it is my medical opinion that Risa Howard should continue physical therapy treatment at [facility]  [address]  [place] [address] [phone].    Signature: __________________________________  Felipe Sadler MD    Discharge Summary     I certify that the therapy services are furnished while this patient is under my care.  The services outlined above are required by this patient, and will be reviewed every 90 days.     PHYSICIAN: Felipe Sadler MD  NPI: [id number]      DATE: 25     Please sign and return via fax to [phone]. Thank you, [facility].    Timed:  Manual Therapy:    0     mins  57572;  Therapeutic Exercise:    0     mins  28243;     Neuromuscular [place]:    0    mins  32619;    Therapeutic Activity:     0     mins  84917;     Gait Trainin     mins  08338;     Aquatics                         0      mins  14415  Self Care                        0      mins  52405    Un-timed:  Mechanical Traction      0     mins  44489  Dry Needling     0      mins self-pay  Electrical Stimulation:    0     mins  42287 ( );  Re-Eval:         10     mins  70402    Timed Treatment:   0   mins   Total Treatment:     10   mins

## (undated) DEVICE — LINER SURG CANSTR SXN S/RIGD 1500CC

## (undated) DEVICE — Device

## (undated) DEVICE — CONN JET HYDRA H20 AUXILIARY DISP

## (undated) DEVICE — SOLIDIFIER LIQLOC PLS 1500CC BT

## (undated) DEVICE — Device: Brand: DEFENDO AIR/WATER/SUCTION AND BIOPSY VALVE

## (undated) DEVICE — SOL IRRG H2O PL/BG 1000ML STRL